# Patient Record
Sex: MALE | Race: WHITE | NOT HISPANIC OR LATINO | URBAN - NONMETROPOLITAN AREA
[De-identification: names, ages, dates, MRNs, and addresses within clinical notes are randomized per-mention and may not be internally consistent; named-entity substitution may affect disease eponyms.]

---

## 2022-01-12 ENCOUNTER — OFFICE VISIT (OUTPATIENT)
Dept: URGENT CARE | Facility: PHYSICIAN GROUP | Age: 27
End: 2022-01-12
Payer: COMMERCIAL

## 2022-01-12 VITALS
DIASTOLIC BLOOD PRESSURE: 84 MMHG | BODY MASS INDEX: 26.46 KG/M2 | TEMPERATURE: 97.6 F | SYSTOLIC BLOOD PRESSURE: 124 MMHG | HEIGHT: 71 IN | WEIGHT: 189 LBS | RESPIRATION RATE: 16 BRPM | HEART RATE: 72 BPM | OXYGEN SATURATION: 94 %

## 2022-01-12 DIAGNOSIS — R05.9 COUGH: ICD-10-CM

## 2022-01-12 DIAGNOSIS — U07.1 COVID-19: ICD-10-CM

## 2022-01-12 PROCEDURE — 99203 OFFICE O/P NEW LOW 30 MIN: CPT | Performed by: PHYSICIAN ASSISTANT

## 2022-01-12 RX ORDER — DEXTROMETHORPHAN HYDROBROMIDE AND PROMETHAZINE HYDROCHLORIDE 15; 6.25 MG/5ML; MG/5ML
5 SYRUP ORAL EVERY 4 HOURS PRN
Qty: 100 ML | Refills: 0 | Status: SHIPPED | OUTPATIENT
Start: 2022-01-12

## 2022-01-12 RX ORDER — PREDNISONE 20 MG/1
40 TABLET ORAL DAILY
Qty: 10 TABLET | Refills: 0 | Status: SHIPPED | OUTPATIENT
Start: 2022-01-12 | End: 2022-01-17

## 2022-01-12 ASSESSMENT — ENCOUNTER SYMPTOMS
DIARRHEA: 0
DIAPHORESIS: 0
MYALGIAS: 0
NAUSEA: 0
HEADACHES: 0
CHILLS: 0
FEVER: 0
SINUS PAIN: 0
SPUTUM PRODUCTION: 0
COUGH: 1
WHEEZING: 0
SHORTNESS OF BREATH: 1
VOMITING: 0
SORE THROAT: 0
DIZZINESS: 0
ABDOMINAL PAIN: 0
STRIDOR: 0
PALPITATIONS: 0

## 2022-01-12 NOTE — PROGRESS NOTES
"Subjective:   Tomy Allen is a 26 y.o. male who presents for Coronavirus Screening (Had covid last sunday, pt states lingering cough and sob )    HPI:  This is a very pleasant 26-year-old male presenting to the clinic after recently tested positive for COVID-19.  The patient tested positive on 1/2/2022.  He has been experiencing lingering cough and intermittent bouts of shortness of breath.  His cough is dry nonproductive.  He has been afebrile.  Denies any body aches or chills.  Still tolerating oral intake without complication.  No history of cardiopulmonary disease.  Has been using OTC cough syrup with minimal improvement.      Review of Systems   Constitutional: Positive for malaise/fatigue. Negative for chills, diaphoresis and fever.   HENT: Negative for congestion, ear pain, sinus pain and sore throat.    Respiratory: Positive for cough and shortness of breath. Negative for sputum production, wheezing and stridor.    Cardiovascular: Negative for chest pain and palpitations.   Gastrointestinal: Negative for abdominal pain, diarrhea, nausea and vomiting.   Musculoskeletal: Negative for myalgias.   Neurological: Negative for dizziness and headaches.   Endo/Heme/Allergies: Negative for environmental allergies.       Medications:    • predniSONE Tabs  • promethazine-dextromethorphan    Allergies: Patient has no known allergies.    Problem List: Tomy Allen does not have a problem list on file.    Surgical History:  No past surgical history on file.    Past Social Hx: Tomy Allen  reports that he has never smoked. He has never used smokeless tobacco. He reports current alcohol use. He reports previous drug use.     Past Family Hx:  Tomy Allen family history is not on file.     Problem list, medications, and allergies reviewed by myself today in Epic.     Objective:     /84   Pulse 72   Temp 36.4 °C (97.6 °F) (Temporal)   Resp 16   Ht 1.803 m (5' 11\")   Wt 85.7 kg (189 lb)   SpO2 " 94%   BMI 26.36 kg/m²     Physical Exam  Constitutional:       General: He is not in acute distress.     Appearance: Normal appearance. He is not ill-appearing, toxic-appearing or diaphoretic.   HENT:      Head: Normocephalic and atraumatic.      Right Ear: Tympanic membrane, ear canal and external ear normal.      Left Ear: Tympanic membrane, ear canal and external ear normal.      Nose: Nose normal. No congestion or rhinorrhea.      Mouth/Throat:      Mouth: Mucous membranes are moist.      Pharynx: No oropharyngeal exudate or posterior oropharyngeal erythema.   Eyes:      Conjunctiva/sclera: Conjunctivae normal.   Cardiovascular:      Rate and Rhythm: Normal rate and regular rhythm.      Pulses: Normal pulses.      Heart sounds: Normal heart sounds.   Pulmonary:      Effort: Pulmonary effort is normal. No respiratory distress.      Breath sounds: Normal breath sounds. No wheezing, rhonchi or rales.   Musculoskeletal:      Cervical back: Normal range of motion. No muscular tenderness.   Lymphadenopathy:      Cervical: No cervical adenopathy.   Skin:     General: Skin is warm and dry.      Capillary Refill: Capillary refill takes less than 2 seconds.   Neurological:      Mental Status: He is alert.   Psychiatric:         Mood and Affect: Mood normal.         Thought Content: Thought content normal.           Assessment/Plan:     Comments/MDM:     • Patient tested positive for COVID-19 in 1/2/2022.  Currently experiencing lingering cough and shortness of breath.  Cough is dry nonproductive.  In clinic his lung sounds are clear to auscultation.  No wheezes, rhonchi or rales.  SPO2 94% on room air.  Patient is afebrile.  Minimal concern for secondary bacterial infection at this time.  Begin treatment with a 5-day course of prednisone.  Take as directed and with food.  Discussed potential red flag symptoms that would warrant emergent follow-up.  Please call with any questions or concerns.     Diagnosis and associated  orders:     1. COVID-19  predniSONE (DELTASONE) 20 MG Tab    promethazine-dextromethorphan (PROMETHAZINE-DM) 6.25-15 MG/5ML syrup   2. Cough              Differential diagnosis, natural history, supportive care, and indications for immediate follow-up discussed.    I personally reviewed prior external notes and test results pertinent to today's visit.     Advised the patient to follow-up with the primary care physician for recheck, reevaluation, and consideration of further management.    Please note that this dictation was created using voice recognition software. I have made reasonable attempt to correct obvious errors, but I expect that there are errors of grammar and possibly content that I did not discover before finalizing the note.    This note was electronically signed by CARLO Beaulieu PA-C